# Patient Record
Sex: FEMALE | Race: BLACK OR AFRICAN AMERICAN | NOT HISPANIC OR LATINO | Employment: PART TIME | URBAN - METROPOLITAN AREA
[De-identification: names, ages, dates, MRNs, and addresses within clinical notes are randomized per-mention and may not be internally consistent; named-entity substitution may affect disease eponyms.]

---

## 2018-09-01 PROBLEM — Z79.01 CHRONIC ANTICOAGULATION: Status: ACTIVE | Noted: 2018-09-01

## 2020-03-04 ENCOUNTER — PATIENT OUTREACH (OUTPATIENT)
Dept: ADMINISTRATIVE | Facility: HOSPITAL | Age: 45
End: 2020-03-04

## 2021-05-06 ENCOUNTER — PATIENT MESSAGE (OUTPATIENT)
Dept: RESEARCH | Facility: HOSPITAL | Age: 46
End: 2021-05-06

## 2024-09-09 PROBLEM — I50.22 HEART FAILURE WITH MILDLY REDUCED EJECTION FRACTION (HFMREF): Status: ACTIVE | Noted: 2024-09-09

## 2024-09-09 PROBLEM — I48.11 LONGSTANDING PERSISTENT ATRIAL FIBRILLATION: Status: ACTIVE | Noted: 2024-09-09

## 2024-09-09 PROBLEM — I50.22 HEART FAILURE WITH MILDLY REDUCED EJECTION FRACTION (HFMREF): Chronic | Status: ACTIVE | Noted: 2024-09-09

## 2024-09-09 PROBLEM — N17.9 AKI (ACUTE KIDNEY INJURY): Status: ACTIVE | Noted: 2024-09-09

## 2024-09-09 PROBLEM — N18.31 CKD STAGE 3A, GFR 45-59 ML/MIN: Status: ACTIVE | Noted: 2024-09-09

## 2024-09-09 PROBLEM — I48.92 ATRIAL FIBRILLATION AND FLUTTER: Status: ACTIVE | Noted: 2024-09-09

## 2024-09-09 PROBLEM — I48.91 ATRIAL FIBRILLATION AND FLUTTER: Status: ACTIVE | Noted: 2024-09-09

## 2024-11-25 ENCOUNTER — TELEPHONE (OUTPATIENT)
Dept: OBSTETRICS AND GYNECOLOGY | Facility: CLINIC | Age: 49
End: 2024-11-25
Payer: MEDICAID

## 2024-11-25 NOTE — TELEPHONE ENCOUNTER
----- Message from Elba sent at 11/25/2024  3:04 PM CST -----  Regarding: Same Day Appointment  Contact: patient  Type:  Same Day Appointment Request    Caller is requesting a same day appointment.  Caller declined first available appointment listed below.    Name of Caller:Marilin  When is the first available appointment?n/a  Symptoms: UTI  Best Call Back Number: [phone] (home)     Additional Information: New Patient    Thanks,  SJ

## 2025-02-04 PROBLEM — N17.9 SEPSIS WITH ACUTE RENAL FAILURE WITHOUT SEPTIC SHOCK: Status: ACTIVE | Noted: 2025-02-04

## 2025-02-04 PROBLEM — E11.69 HYPERLIPIDEMIA ASSOCIATED WITH TYPE 2 DIABETES MELLITUS: Chronic | Status: ACTIVE | Noted: 2023-08-16

## 2025-02-04 PROBLEM — E78.5 HYPERLIPIDEMIA: Status: ACTIVE | Noted: 2023-08-16

## 2025-02-04 PROBLEM — A41.9 SEPSIS WITH ACUTE RENAL FAILURE WITHOUT SEPTIC SHOCK: Status: ACTIVE | Noted: 2025-02-04

## 2025-02-04 PROBLEM — I48.92 ATRIAL FLUTTER: Status: ACTIVE | Noted: 2022-10-28

## 2025-02-04 PROBLEM — J11.1 INFLUENZA: Status: ACTIVE | Noted: 2025-02-04

## 2025-02-04 PROBLEM — E78.5 HYPERLIPIDEMIA ASSOCIATED WITH TYPE 2 DIABETES MELLITUS: Chronic | Status: ACTIVE | Noted: 2023-08-16

## 2025-02-04 PROBLEM — F41.9 ANXIETY: Status: ACTIVE | Noted: 2024-06-05

## 2025-02-04 PROBLEM — N17.9 AKI (ACUTE KIDNEY INJURY): Status: ACTIVE | Noted: 2025-02-04

## 2025-02-04 PROBLEM — E11.10 DIABETIC KETOACIDOSIS WITHOUT COMA ASSOCIATED WITH TYPE 2 DIABETES MELLITUS: Status: ACTIVE | Noted: 2020-04-28

## 2025-02-04 PROBLEM — F31.9 BIPOLAR DISORDER: Status: ACTIVE | Noted: 2025-02-04

## 2025-02-04 PROBLEM — R65.20 SEPSIS WITH ACUTE RENAL FAILURE WITHOUT SEPTIC SHOCK: Status: ACTIVE | Noted: 2025-02-04

## 2025-02-05 PROBLEM — J10.1 INFLUENZA A H1N1 INFECTION: Status: ACTIVE | Noted: 2025-02-05

## 2025-02-06 PROBLEM — N17.9 SEPSIS WITH ACUTE RENAL FAILURE WITHOUT SEPTIC SHOCK: Status: RESOLVED | Noted: 2025-02-04 | Resolved: 2025-02-06

## 2025-02-06 PROBLEM — R65.20 SEPSIS WITH ACUTE RENAL FAILURE WITHOUT SEPTIC SHOCK: Status: RESOLVED | Noted: 2025-02-04 | Resolved: 2025-02-06

## 2025-02-06 PROBLEM — N17.9 AKI (ACUTE KIDNEY INJURY): Status: RESOLVED | Noted: 2025-02-04 | Resolved: 2025-02-06

## 2025-02-06 PROBLEM — N17.0 SEPSIS WITH ACUTE RENAL FAILURE AND TUBULAR NECROSIS WITHOUT SEPTIC SHOCK: Status: ACTIVE | Noted: 2025-02-04

## 2025-02-06 PROBLEM — I48.92 ATRIAL FLUTTER: Status: RESOLVED | Noted: 2022-10-28 | Resolved: 2025-02-06

## 2025-02-06 PROBLEM — A41.9 SEPSIS WITH ACUTE RENAL FAILURE WITHOUT SEPTIC SHOCK: Status: RESOLVED | Noted: 2025-02-04 | Resolved: 2025-02-06

## 2025-02-10 LAB
LEFT EYE DM RETINOPATHY: NEGATIVE
RIGHT EYE DM RETINOPATHY: NEGATIVE

## 2025-02-14 ENCOUNTER — PATIENT OUTREACH (OUTPATIENT)
Dept: ADMINISTRATIVE | Facility: HOSPITAL | Age: 50
End: 2025-02-14
Payer: MEDICAID

## 2025-08-26 DIAGNOSIS — I35.1: Primary | ICD-10-CM
